# Patient Record
Sex: FEMALE | Race: OTHER | NOT HISPANIC OR LATINO | URBAN - METROPOLITAN AREA
[De-identification: names, ages, dates, MRNs, and addresses within clinical notes are randomized per-mention and may not be internally consistent; named-entity substitution may affect disease eponyms.]

---
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
## 2025-03-17 NOTE — HPI: RASH ([email or web address])
This was a shared visit with the MARIO ALBERTO. I reviewed and verified the documentation.
How Severe Is Your Eczema?: mild
Is This A New Presentation, Or A Follow-Up?: Rash